# Patient Record
Sex: MALE | Race: WHITE | Employment: FULL TIME | URBAN - METROPOLITAN AREA
[De-identification: names, ages, dates, MRNs, and addresses within clinical notes are randomized per-mention and may not be internally consistent; named-entity substitution may affect disease eponyms.]

---

## 2019-02-26 ENCOUNTER — OFFICE VISIT (OUTPATIENT)
Dept: FAMILY MEDICINE CLINIC | Facility: CLINIC | Age: 44
End: 2019-02-26
Payer: COMMERCIAL

## 2019-02-26 ENCOUNTER — TELEPHONE (OUTPATIENT)
Dept: FAMILY MEDICINE CLINIC | Facility: CLINIC | Age: 44
End: 2019-02-26

## 2019-02-26 VITALS
HEART RATE: 96 BPM | TEMPERATURE: 98 F | HEIGHT: 69 IN | DIASTOLIC BLOOD PRESSURE: 86 MMHG | RESPIRATION RATE: 16 BRPM | WEIGHT: 241.19 LBS | BODY MASS INDEX: 35.72 KG/M2 | SYSTOLIC BLOOD PRESSURE: 126 MMHG | OXYGEN SATURATION: 98 %

## 2019-02-26 DIAGNOSIS — J98.01 BRONCHOSPASM: ICD-10-CM

## 2019-02-26 DIAGNOSIS — J11.1 INFLUENZA: Primary | ICD-10-CM

## 2019-02-26 DIAGNOSIS — R05.9 COUGH: ICD-10-CM

## 2019-02-26 PROCEDURE — 99212 OFFICE O/P EST SF 10 MIN: CPT | Performed by: FAMILY MEDICINE

## 2019-02-26 RX ORDER — ALBUTEROL SULFATE 90 UG/1
2 AEROSOL, METERED RESPIRATORY (INHALATION) EVERY 6 HOURS PRN
Qty: 1 INHALER | Refills: 1 | Status: SHIPPED | OUTPATIENT
Start: 2019-02-26 | End: 2019-03-28

## 2019-02-26 RX ORDER — PROMETHAZINE HYDROCHLORIDE AND CODEINE PHOSPHATE 6.25; 1 MG/5ML; MG/5ML
2.5 SYRUP ORAL EVERY 4 HOURS PRN
Qty: 118 ML | Refills: 0 | Status: SHIPPED | OUTPATIENT
Start: 2019-02-26 | End: 2021-05-19

## 2019-02-26 RX ORDER — PREDNISONE 10 MG/1
TABLET ORAL
Qty: 18 TABLET | Refills: 0 | Status: SHIPPED | OUTPATIENT
Start: 2019-02-26 | End: 2021-05-19

## 2019-02-26 NOTE — TELEPHONE ENCOUNTER
Spouse called to have pt seen TODAY after 4. Advised pt that [facility] likes to see his own pt and we could get him in tomorrow. Spouse really wanted today.

## 2019-02-26 NOTE — PROGRESS NOTES
HPI:   Ariane Lim is a 37year old male who presents for upper respiratory symptoms for  4  days. Patient reports sore throat, congestion, fever with Tmax to 102, dry cough, cough with clear colored sputum, cough is keeping pt up at night.  Is coughing SpO2 98%   BMI 35.62 kg/m²   GENERAL: well developed, well nourished,in no apparent distress  SKIN: no rashes,no suspicious lesions  EYES:PERRLA, EOMI, normal optic disk,conjunctiva are clear  HEENT: atraumatic, normocephalic,ears and throat are clear  NEC

## 2019-02-26 NOTE — TELEPHONE ENCOUNTER
Routed to DS    This is a [facility]J pt that is on your schedule today- he has not been seen since 5/2016. DO you want to see this pt?

## 2019-04-03 ENCOUNTER — WALK IN (OUTPATIENT)
Dept: URGENT CARE | Age: 44
End: 2019-04-03

## 2019-04-03 VITALS
WEIGHT: 247.62 LBS | BODY MASS INDEX: 35.45 KG/M2 | OXYGEN SATURATION: 99 % | HEART RATE: 87 BPM | RESPIRATION RATE: 19 BRPM | TEMPERATURE: 97.1 F | HEIGHT: 70 IN | SYSTOLIC BLOOD PRESSURE: 134 MMHG | DIASTOLIC BLOOD PRESSURE: 82 MMHG

## 2019-04-03 DIAGNOSIS — Z02.1 PHYSICAL EXAM, PRE-EMPLOYMENT: Primary | ICD-10-CM

## 2019-04-03 PROCEDURE — X0945 SELF PAY APN OR PA PERFORMED ADMINISTRATIVE PHYSICAL: HCPCS | Performed by: NURSE PRACTITIONER

## 2019-04-03 SDOH — HEALTH STABILITY: MENTAL HEALTH: HOW OFTEN DO YOU HAVE A DRINK CONTAINING ALCOHOL?: NEVER

## 2020-10-01 ENCOUNTER — TELEPHONE (OUTPATIENT)
Dept: FAMILY MEDICINE CLINIC | Facility: CLINIC | Age: 45
End: 2020-10-01

## 2020-10-01 NOTE — TELEPHONE ENCOUNTER
Pt called, has a rash on his chest, stomach and back. Pt would like to discuss.   Please all pt at [phone]

## 2020-10-01 NOTE — TELEPHONE ENCOUNTER
Patient advised. States is just very itchy. Benadryl? Per Kelin Laws - ok to take Benadryl. Scheduled doxi visit.     Future Appointments   Date Time Provider Den Zhang   10/2/2020  8:15 AM Bob Pina MD [facility] [address]

## 2020-10-01 NOTE — TELEPHONE ENCOUNTER
Phone call from patient. States that he noticed a rash on abdomen, chest and back after his shower. Red, raised, itchy. Nothing new. For the last 2 days has felt nauseated, felt heart was beating faster. Feels fine today. No fever. No cough.

## 2020-10-02 ENCOUNTER — TELEMEDICINE (OUTPATIENT)
Dept: FAMILY MEDICINE CLINIC | Facility: CLINIC | Age: 45
End: 2020-10-02
Payer: COMMERCIAL

## 2020-10-02 DIAGNOSIS — R21 RASH: ICD-10-CM

## 2020-10-02 DIAGNOSIS — R53.81 MALAISE: Primary | ICD-10-CM

## 2020-10-02 PROCEDURE — 99213 OFFICE O/P EST LOW 20 MIN: CPT | Performed by: FAMILY MEDICINE

## 2020-10-02 RX ORDER — PREDNISONE 20 MG/1
TABLET ORAL
Qty: 18 TABLET | Refills: 0 | Status: SHIPPED | OUTPATIENT
Start: 2020-10-02 | End: 2021-05-19

## 2020-10-02 NOTE — PROGRESS NOTES
My Chart/ Video/Telephone Visit Check-In Due to [address] [facility] verbally consents to a Video Check-In service on 10/02/20.   Patient understands and accepts financial responsibility for any deductible, co-insurance and/or co-pays associat respiratory distress noted when conversing with me, able to speak in full sentences. Skin: difficulty to assess rash on his trunk, but it appears to be an urticarial type rash    ASSESSMENT AND PLAN    1.  Malaise  Rest and push fluids  Tylenol as needed

## 2021-05-19 ENCOUNTER — OFFICE VISIT (OUTPATIENT)
Dept: FAMILY MEDICINE CLINIC | Facility: CLINIC | Age: 46
End: 2021-05-19
Payer: COMMERCIAL

## 2021-05-19 VITALS
BODY MASS INDEX: 38.35 KG/M2 | TEMPERATURE: 97 F | OXYGEN SATURATION: 97 % | SYSTOLIC BLOOD PRESSURE: 124 MMHG | HEART RATE: 71 BPM | RESPIRATION RATE: 17 BRPM | HEIGHT: 68.5 IN | DIASTOLIC BLOOD PRESSURE: 84 MMHG | WEIGHT: 256 LBS

## 2021-05-19 DIAGNOSIS — Z01.818 PREOP EXAMINATION: Primary | ICD-10-CM

## 2021-05-19 DIAGNOSIS — M20.5X1 HALLUX LIMITUS OF RIGHT FOOT: ICD-10-CM

## 2021-05-19 PROCEDURE — 80053 COMPREHEN METABOLIC PANEL: CPT | Performed by: FAMILY MEDICINE

## 2021-05-19 PROCEDURE — 3079F DIAST BP 80-89 MM HG: CPT | Performed by: FAMILY MEDICINE

## 2021-05-19 PROCEDURE — 3074F SYST BP LT 130 MM HG: CPT | Performed by: FAMILY MEDICINE

## 2021-05-19 PROCEDURE — 99243 OFF/OP CNSLTJ NEW/EST LOW 30: CPT | Performed by: FAMILY MEDICINE

## 2021-05-19 PROCEDURE — 85025 COMPLETE CBC W/AUTO DIFF WBC: CPT | Performed by: FAMILY MEDICINE

## 2021-05-19 PROCEDURE — 3008F BODY MASS INDEX DOCD: CPT | Performed by: FAMILY MEDICINE

## 2021-05-19 NOTE — PROGRESS NOTES
Cj Samuels is a 39year old male.     CC:  Patient presents with:  Pre-Op Exam: per pt      HPI:  I am seeing Cj Samuels for preoperative evaluation at the request of Dr. Tha Mendoza [id number] for my evaluation of the patient's medical problems sandi No lymphadenopathy, thyromegaly or masses  CAR: S1, S2 normal, RRR; no S3, no S4; no click; murmur negative  PULM: clear to auscultation B, no accessory muscle use  GI: normal active BS+, soft, nondistended; no HSM; no masses; no bruits; no masses; nontend 6   BUN      7 - 18 mg/dL 18   CREATININE      0.70 - 1.30 mg/dL 1.03   BUN/CREAT Ratio      10.0 - 20.0 17.5   CALCIUM      8.5 - 10.1 mg/dL 9.0   CALCULATED OSMOLALITY      275 - 295 mOsm/kg 290   eGFR NON-AFR.  AMERICAN      >=60 87   eGFR  AMERIC

## 2022-06-29 ENCOUNTER — OFFICE VISIT (OUTPATIENT)
Dept: FAMILY MEDICINE CLINIC | Facility: CLINIC | Age: 47
End: 2022-06-29
Payer: COMMERCIAL

## 2022-06-29 VITALS
TEMPERATURE: 97 F | HEART RATE: 76 BPM | BODY MASS INDEX: 30.56 KG/M2 | DIASTOLIC BLOOD PRESSURE: 76 MMHG | SYSTOLIC BLOOD PRESSURE: 116 MMHG | OXYGEN SATURATION: 98 % | RESPIRATION RATE: 18 BRPM | HEIGHT: 68.5 IN | WEIGHT: 204 LBS

## 2022-06-29 DIAGNOSIS — Z00.00 WELL ADULT EXAM: Primary | ICD-10-CM

## 2022-06-29 PROCEDURE — 3074F SYST BP LT 130 MM HG: CPT | Performed by: FAMILY MEDICINE

## 2022-06-29 PROCEDURE — 3008F BODY MASS INDEX DOCD: CPT | Performed by: FAMILY MEDICINE

## 2022-06-29 PROCEDURE — 99396 PREV VISIT EST AGE 40-64: CPT | Performed by: FAMILY MEDICINE

## 2022-06-29 PROCEDURE — 90715 TDAP VACCINE 7 YRS/> IM: CPT | Performed by: FAMILY MEDICINE

## 2022-06-29 PROCEDURE — 90471 IMMUNIZATION ADMIN: CPT | Performed by: FAMILY MEDICINE

## 2022-06-29 PROCEDURE — 3078F DIAST BP <80 MM HG: CPT | Performed by: FAMILY MEDICINE

## 2023-11-26 ENCOUNTER — OFFICE VISIT (OUTPATIENT)
Dept: FAMILY MEDICINE CLINIC | Facility: CLINIC | Age: 48
End: 2023-11-26
Payer: COMMERCIAL

## 2023-11-26 VITALS
SYSTOLIC BLOOD PRESSURE: 128 MMHG | BODY MASS INDEX: 31 KG/M2 | TEMPERATURE: 98 F | OXYGEN SATURATION: 98 % | RESPIRATION RATE: 18 BRPM | HEART RATE: 81 BPM | WEIGHT: 204 LBS | DIASTOLIC BLOOD PRESSURE: 88 MMHG

## 2023-11-26 DIAGNOSIS — U07.1 COVID-19: Primary | ICD-10-CM

## 2023-11-26 LAB
OPERATOR ID: ABNORMAL
POCT LOT NUMBER: ABNORMAL
RAPID SARS-COV-2 BY PCR: DETECTED

## 2023-11-26 PROCEDURE — 3074F SYST BP LT 130 MM HG: CPT | Performed by: NURSE PRACTITIONER

## 2023-11-26 PROCEDURE — U0002 COVID-19 LAB TEST NON-CDC: HCPCS | Performed by: NURSE PRACTITIONER

## 2023-11-26 PROCEDURE — 3079F DIAST BP 80-89 MM HG: CPT | Performed by: NURSE PRACTITIONER

## 2023-11-26 PROCEDURE — 99213 OFFICE O/P EST LOW 20 MIN: CPT | Performed by: NURSE PRACTITIONER

## 2023-11-26 RX ORDER — ALBUTEROL SULFATE 90 UG/1
2 AEROSOL, METERED RESPIRATORY (INHALATION) EVERY 4 HOURS PRN
Qty: 1 EACH | Refills: 0 | Status: SHIPPED | OUTPATIENT
Start: 2023-11-26

## 2023-11-26 NOTE — PATIENT INSTRUCTIONS
1. Rest. Drink plenty of fluids. 2. Supportive care as discussed. Albuterol inhaler as prescribed. 3. Covid-19 test is positive. .  Self quarantine until 5 days from onset of symptoms. If you have no symptoms or your symptoms are resolving after 5 days, you can leave your house. Continue to wear a mask around others for 5 additional days. If symptoms not resolved at 5 days continue quarantine for up to 10 days from onset of symptoms AND no fever for at least 24hrs, AND and improvement in symptoms. 4. Follow up with PMD in 4-5 days for re-eval. Go to the emergency department immediately if symptoms worsen, change, you develop chest discomfort, wheezing, shortness of breath, or if you have any concerns.

## 2024-02-15 ENCOUNTER — MED REC SCAN ONLY (OUTPATIENT)
Dept: FAMILY MEDICINE CLINIC | Facility: CLINIC | Age: 49
End: 2024-02-15

## 2024-06-06 ENCOUNTER — PATIENT MESSAGE (OUTPATIENT)
Dept: FAMILY MEDICINE CLINIC | Facility: CLINIC | Age: 49
End: 2024-06-06

## 2024-06-06 NOTE — TELEPHONE ENCOUNTER
From: Minh Blood  To: Narciso Lantigua  Sent: 6/6/2024 10:24 AM CDT  Subject: Referral for Colonoscopy     I’m looking to get a Screening Colonoscopy. I would like to have the procedure at the [facility]. It appears that Dr. Jin Malhotra does work there and from my research he seems to be a quality physician. I’m looking for recommendations and what my next steps would be. Thanks! -Zoran

## (undated) NOTE — LETTER
Date: 11/26/2023    Patient Name: Sita Gibson          To Whom it may concern: The above patient was seen at the [facility] for treatment of a medical condition. He has given me permission to include his health information in this letter. Mr. Dominguez Tejeda tested positive for covid-19 today in our office. Please excuse his absences while he quarantines per CDC guidelines. (Self quarantine until 5 days from onset of symptoms. If you have no symptoms or your symptoms are resolving after 5 days, you can leave your house. Continue to wear a mask around others for 5 additional days.  If symptoms not resolved at 5 days continue quarantine for up to 10 days from onset of symptoms AND no fever for at least 24hrs, AND and improvement in symptoms.)          Kind regards,    JAYLON Swift